# Patient Record
Sex: MALE | Race: ASIAN | Employment: FULL TIME | URBAN - METROPOLITAN AREA
[De-identification: names, ages, dates, MRNs, and addresses within clinical notes are randomized per-mention and may not be internally consistent; named-entity substitution may affect disease eponyms.]

---

## 2021-05-10 ENCOUNTER — APPOINTMENT (OUTPATIENT)
Dept: GENERAL RADIOLOGY | Age: 26
End: 2021-05-10
Attending: PHYSICIAN ASSISTANT
Payer: COMMERCIAL

## 2021-05-10 ENCOUNTER — HOSPITAL ENCOUNTER (OUTPATIENT)
Age: 26
Discharge: HOME | End: 2021-05-10
Payer: COMMERCIAL

## 2021-05-10 VITALS
HEIGHT: 75 IN | OXYGEN SATURATION: 99 % | WEIGHT: 195 LBS | RESPIRATION RATE: 18 BRPM | BODY MASS INDEX: 24.25 KG/M2 | DIASTOLIC BLOOD PRESSURE: 72 MMHG | SYSTOLIC BLOOD PRESSURE: 130 MMHG | HEART RATE: 61 BPM | TEMPERATURE: 99 F

## 2021-05-10 DIAGNOSIS — S91.331A PUNCTURE WOUND OF RIGHT FOOT, INITIAL ENCOUNTER: Primary | ICD-10-CM

## 2021-05-10 PROCEDURE — 99203 OFFICE O/P NEW LOW 30 MIN: CPT

## 2021-05-10 PROCEDURE — 73630 X-RAY EXAM OF FOOT: CPT | Performed by: PHYSICIAN ASSISTANT

## 2021-05-10 PROCEDURE — 90471 IMMUNIZATION ADMIN: CPT

## 2021-05-10 NOTE — ED INITIAL ASSESSMENT (HPI)
C/o possible foreign body to right foot, stepped to a broken piece of glass or plastic last night. Hurts to walk.

## 2021-05-10 NOTE — ED PROVIDER NOTES
Patient Seen in: [facility]      History   Patient presents with:  FB in Skin    Stated Complaint: Right Foot Cut on Glass/Plastic    HPI/Subjective:   HPI    21-year-old male presents to the immediate care for evaluation of right foot pa motion and neck supple. Right ankle: Normal.      Right foot: Normal range of motion and normal capillary refill. Tenderness (by puncture) present. No swelling. Normal pulse. Feet:    Skin:     General: Skin is warm and dry.       Capillary Refi All questions answered.                              Disposition and Plan     Clinical Impression:  Puncture wound of right foot, initial encounter  (primary encounter diagnosis)     Disposition:  Discharge  5/10/2021 11:28 am    Follow-up:  Gloria Calderón

## 2022-03-18 PROBLEM — F90.9 ATTENTION DEFICIT HYPERACTIVITY DISORDER (ADHD), UNSPECIFIED ADHD TYPE: Status: ACTIVE | Noted: 2022-03-18

## 2022-04-30 ENCOUNTER — WALK IN (OUTPATIENT)
Dept: URGENT CARE | Age: 27
End: 2022-04-30

## 2022-04-30 VITALS
SYSTOLIC BLOOD PRESSURE: 102 MMHG | WEIGHT: 200 LBS | HEART RATE: 82 BPM | BODY MASS INDEX: 25.67 KG/M2 | DIASTOLIC BLOOD PRESSURE: 68 MMHG | TEMPERATURE: 98.1 F | RESPIRATION RATE: 16 BRPM | OXYGEN SATURATION: 98 % | HEIGHT: 74 IN

## 2022-04-30 DIAGNOSIS — B34.9 ACUTE VIRAL SYNDROME: ICD-10-CM

## 2022-04-30 DIAGNOSIS — R68.89 FLU-LIKE SYMPTOMS: Primary | ICD-10-CM

## 2022-04-30 DIAGNOSIS — J02.9 SORE THROAT: ICD-10-CM

## 2022-04-30 LAB
FLUAV AG UPPER RESP QL IA.RAPID: NEGATIVE
FLUBV AG UPPER RESP QL IA.RAPID: NEGATIVE
INTERNAL PROCEDURAL CONTROLS ACCEPTABLE: YES
S PYO AG THROAT QL IA.RAPID: NEGATIVE
SARS-COV+SARS-COV-2 AG RESP QL IA.RAPID: NOT DETECTED
TEST LOT EXPIRATION DATE: NORMAL
TEST LOT NUMBER: NORMAL

## 2022-04-30 PROCEDURE — 87804 INFLUENZA ASSAY W/OPTIC: CPT | Performed by: NURSE PRACTITIONER

## 2022-04-30 PROCEDURE — 87426 SARSCOV CORONAVIRUS AG IA: CPT | Performed by: NURSE PRACTITIONER

## 2022-04-30 PROCEDURE — 87880 STREP A ASSAY W/OPTIC: CPT | Performed by: NURSE PRACTITIONER

## 2022-04-30 PROCEDURE — 99213 OFFICE O/P EST LOW 20 MIN: CPT | Performed by: NURSE PRACTITIONER

## 2022-04-30 RX ORDER — ALPRAZOLAM 2 MG/1
2 TABLET ORAL NIGHTLY PRN
COMMUNITY

## 2022-04-30 RX ORDER — DEXTROAMPHETAMINE SACCHARATE, AMPHETAMINE ASPARTATE MONOHYDRATE, DEXTROAMPHETAMINE SULFATE AND AMPHETAMINE SULFATE 2.5; 2.5; 2.5; 2.5 MG/1; MG/1; MG/1; MG/1
10 CAPSULE, EXTENDED RELEASE ORAL DAILY
COMMUNITY

## 2022-04-30 RX ORDER — ZOLPIDEM TARTRATE 5 MG/1
5 TABLET ORAL NIGHTLY PRN
COMMUNITY

## 2022-04-30 ASSESSMENT — ENCOUNTER SYMPTOMS
EYE DISCHARGE: 0
HEADACHES: 1
RHINORRHEA: 1
FATIGUE: 1
CHILLS: 0
TROUBLE SWALLOWING: 0
ABDOMINAL PAIN: 0
NAUSEA: 1
COUGH: 1
DIARRHEA: 0
VOICE CHANGE: 0
COLOR CHANGE: 0
EYE ITCHING: 0
CONSTIPATION: 0
SHORTNESS OF BREATH: 0
CHEST TIGHTNESS: 0
SINUS PRESSURE: 1
VOMITING: 0
SORE THROAT: 1
FEVER: 0

## 2022-09-15 ENCOUNTER — LAB ENCOUNTER (OUTPATIENT)
Dept: LAB | Facility: HOSPITAL | Age: 27
End: 2022-09-15
Attending: DENTIST
Payer: COMMERCIAL

## 2022-09-15 DIAGNOSIS — Z01.818 PRE-OP TESTING: ICD-10-CM

## 2022-09-16 LAB — SARS-COV-2 RNA RESP QL NAA+PROBE: NOT DETECTED

## 2022-09-17 ENCOUNTER — ANESTHESIA (OUTPATIENT)
Dept: SURGERY | Facility: HOSPITAL | Age: 27
End: 2022-09-17
Payer: COMMERCIAL

## 2022-09-17 ENCOUNTER — ANESTHESIA EVENT (OUTPATIENT)
Dept: SURGERY | Facility: HOSPITAL | Age: 27
End: 2022-09-17
Payer: COMMERCIAL

## 2022-09-17 RX ORDER — CLINDAMYCIN PHOSPHATE 900 MG/50ML
INJECTION INTRAVENOUS AS NEEDED
Status: DISCONTINUED | OUTPATIENT
Start: 2022-09-17 | End: 2022-09-17 | Stop reason: SURG

## 2022-09-17 RX ORDER — GENTAMICIN SULFATE 100 MG/100ML
INJECTION, SOLUTION INTRAVENOUS AS NEEDED
Status: DISCONTINUED | OUTPATIENT
Start: 2022-09-17 | End: 2022-09-17 | Stop reason: SURG

## 2022-09-17 RX ORDER — GLYCOPYRROLATE 0.2 MG/ML
INJECTION, SOLUTION INTRAMUSCULAR; INTRAVENOUS AS NEEDED
Status: DISCONTINUED | OUTPATIENT
Start: 2022-09-17 | End: 2022-09-17 | Stop reason: SURG

## 2022-09-17 RX ORDER — ROCURONIUM BROMIDE 10 MG/ML
INJECTION, SOLUTION INTRAVENOUS AS NEEDED
Status: DISCONTINUED | OUTPATIENT
Start: 2022-09-17 | End: 2022-09-17 | Stop reason: SURG

## 2022-09-17 RX ORDER — ONDANSETRON 2 MG/ML
INJECTION INTRAMUSCULAR; INTRAVENOUS AS NEEDED
Status: DISCONTINUED | OUTPATIENT
Start: 2022-09-17 | End: 2022-09-17 | Stop reason: SURG

## 2022-09-17 RX ORDER — OXYMETAZOLINE HYDROCHLORIDE 0.05 G/100ML
SPRAY NASAL AS NEEDED
Status: DISCONTINUED | OUTPATIENT
Start: 2022-09-17 | End: 2022-09-17 | Stop reason: SURG

## 2022-09-17 RX ORDER — DEXAMETHASONE SODIUM PHOSPHATE 4 MG/ML
VIAL (ML) INJECTION AS NEEDED
Status: DISCONTINUED | OUTPATIENT
Start: 2022-09-17 | End: 2022-09-17 | Stop reason: SURG

## 2022-09-17 RX ADMIN — ROCURONIUM BROMIDE 15 MG: 10 INJECTION, SOLUTION INTRAVENOUS at 08:12:00

## 2022-09-17 RX ADMIN — ONDANSETRON 4 MG: 2 INJECTION INTRAMUSCULAR; INTRAVENOUS at 08:08:00

## 2022-09-17 RX ADMIN — DEXAMETHASONE SODIUM PHOSPHATE 8 MG: 4 MG/ML VIAL (ML) INJECTION at 08:07:00

## 2022-09-17 RX ADMIN — ROCURONIUM BROMIDE 40 MG: 10 INJECTION, SOLUTION INTRAVENOUS at 07:50:00

## 2022-09-17 RX ADMIN — GENTAMICIN SULFATE 420 MG: 100 INJECTION, SOLUTION INTRAVENOUS at 08:13:00

## 2022-09-17 RX ADMIN — DEXAMETHASONE SODIUM PHOSPHATE 8 MG: 4 MG/ML VIAL (ML) INJECTION at 08:31:00

## 2022-09-17 RX ADMIN — OXYMETAZOLINE HYDROCHLORIDE 6 SPRAY: 0.05 SPRAY NASAL at 07:42:00

## 2022-09-17 RX ADMIN — CLINDAMYCIN PHOSPHATE 900 MG: 900 INJECTION INTRAVENOUS at 08:06:00

## 2022-09-17 RX ADMIN — GLYCOPYRROLATE 0.2 MG: 0.2 INJECTION, SOLUTION INTRAMUSCULAR; INTRAVENOUS at 07:43:00

## 2022-09-17 NOTE — ANESTHESIA PROCEDURE NOTES
Airway  Date/Time: 9/17/2022 8:02 AM  Urgency: elective    Difficult airway    General Information and Staff    Patient location during procedure: OR  Anesthesiologist: Ashly Faustin DO  Performed: anesthesiologist     Indications and Patient Condition  Indications for airway management: anesthesia  Sedation level: deep  Preoxygenated: yes  Patient position: sniffing  Mask difficulty assessment: 1 - vent by mask    Final Airway Details  Final airway type: endotracheal airway      Successful airway: ETT  Cuffed: yes   Successful intubation technique: flexible bronchoscopy  Endotracheal tube insertion site: left naris  Blade: GlideScope  Blade size: #3  ETT size (mm): 7.0    Placement verified by: chest auscultation and capnometry   Measured from: lips  Number of attempts at approach: 2  Ventilation between attempts: BVM  Number of other approaches attempted: 1    Other Attempts  Unsuccessful attempted endotracheal techniques: direct laryngoscopy    Additional Comments  Request by surgeon for nasal intubation and report of limited mouth opening. Propofol induction  Able to mask ventilate  Rocuronium administered  Mouth opening too limited for view of glottis with MAC 3 direct laryngoscopy  Glidescope placed and view of glottis, but limited mouth opening precluded effect use of MacGill forceps  brochoscope placed in ET advanced via left nare  Combined advancement of ET tube through glottis using both bronchoscope and glidescope video views.

## 2022-09-17 NOTE — ANESTHESIA POSTPROCEDURE EVALUATION
[address] [facility],[address] [facility] Patient Status:  Hospital Outpatient Surgery   Age/Gender 24year old male MRN [id number]   [facility] SURGERY Attending Leland Sykes, Oliva Salcedo 148 Day # 0 PCP Christel Diaz MD       Anesthesia Post-op Note    REMOVAL OF TEETH  NUMBER 1, NUMBER 16, NUMBER 17 AND NUMBER 32    Procedure Summary     Date: 09/17/22 Room / Location: [facility] MAIN OR 05 / [facility] MAIN OR    Anesthesia Start: 7288 Anesthesia Stop: 8649    Procedure: REMOVAL OF TEETH  NUMBER 1, NUMBER 16, NUMBER 17 AND NUMBER 32 (N/A ) Diagnosis: (IMPACTED TEETH)    Surgeons: Gaby Leavitt DDS Anesthesiologist: Stephen Bazan DO    Anesthesia Type: general ASA Status: 1          Anesthesia Type: general    Vitals Value Taken Time   /76 09/17/22 0913   Temp 97.8 09/17/22 0913   Pulse 92 09/17/22 0913   Resp 18 09/17/22 0913   SpO2 100 09/17/22 0913       Patient Location: PACU    Anesthesia Type: general    Airway Patency: extubated and patent    Postop Pain Control: adequate    Mental Status: mildly sedated but able to meaningfully participate in the post-anesthesia evaluation    Nausea/Vomiting: none    Cardiopulmonary/Hydration status: stable euvolemic    Complications: no apparent anesthesia related complications    Postop vital signs: stable    Dental Exam: Other: ([place] teeth extracted)    Patient to be discharged from PACU when criteria met.

## 2022-09-17 NOTE — OPERATIVE REPORT
[address]    PATIENT'S NAME: Jesus Howe   ATTENDING PHYSICIAN: Tyrone Sykes DDS   OPERATING PHYSICIAN: Tyrone Sykes DDS   PATIENT ACCOUNT#:   [de-identified]    LOCATION:  [facility] 4 EDWP 10  MEDICAL RECORD #:   [id number]       YOB: 1995  ADMISSION DATE:       09/17/2022      OPERATION DATE:  09/17/2022    OPERATIVE REPORT      PREOPERATIVE DIAGNOSIS:    POSTOPERATIVE DIAGNOSIS:    PROCEDURE:      INDICATIONS:  This is a 72-year-old male who presented to my office for evaluation and possible removal of his wisdom teeth. A procedure was attempted in the office and unfortunately was aborted due to the patient's limited mouth opening. 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 The decision was then made to take him into the OR to do the procedure under general anesthesia and have a dedicated anesthesiologist to avoid any airway compromise during the procedure. OPERATIVE TECHNIQUE:  The patient was identified by the anesthesiologist in the waiting area. He was taken to the OR and nasotracheally intubated. After this, the patient was prepped and draped in the usual oral surgical manner, and local anesthetic of 1:100,000 epinephrine and lidocaine was then applied with a combination of Sensorcaine into the areas of the right and left mandibular trigeminal nerve and the buccal area lateral to the location of the wisdom teeth. In addition, local anesthesia was then placed on the maxilla, posterior aspect, right and left, for subsequent removal of the wisdom teeth. A mucoperiosteal incision was then made with a hockey buccal stick in the areas of teeth #17 and #32 with elevation of the periosteum and surgical removal of tooth #32.   Tooth #17, which is the wisdom tooth in the lower left area, was then removed by a buccal trough and sequential sectioning of the tooth, making sure that the trigeminal nerve was intact. Copious irrigation was then utilized and a 3-0 chromic suture was then utilized for closure. Attention was then placed on the maxillary area on the right and left sides for teeth #1 and #16, where a mucoperiosteal incision was then made and the 2 aforementioned teeth were removed surgically. This completed the oral surgical aspect of his procedure. The patient was then returned to the anesthesiologist for extubation in the OR, which was successful without any complications. Dictated By Glory Shakira Loomis  d: 09/17/2022 08:54:44  t: 09/17/2022 10:20:39  [facility] [id number]  IXY/